# Patient Record
Sex: FEMALE
[De-identification: names, ages, dates, MRNs, and addresses within clinical notes are randomized per-mention and may not be internally consistent; named-entity substitution may affect disease eponyms.]

---

## 2018-01-19 ENCOUNTER — CHARTING TRANS (OUTPATIENT)
Dept: OTHER | Age: 73
End: 2018-01-19

## 2018-11-02 VITALS
OXYGEN SATURATION: 100 % | RESPIRATION RATE: 20 BRPM | TEMPERATURE: 98.1 F | DIASTOLIC BLOOD PRESSURE: 80 MMHG | HEART RATE: 80 BPM | SYSTOLIC BLOOD PRESSURE: 114 MMHG

## 2020-01-01 ENCOUNTER — APPOINTMENT (OUTPATIENT)
Dept: GENERAL RADIOLOGY | Age: 75
End: 2020-01-01
Attending: EMERGENCY MEDICINE
Payer: MEDICARE

## 2020-01-01 ENCOUNTER — HOSPITAL ENCOUNTER (OUTPATIENT)
Age: 75
Discharge: HOME | End: 2020-01-01
Payer: MEDICARE

## 2020-01-01 VITALS
SYSTOLIC BLOOD PRESSURE: 104 MMHG | OXYGEN SATURATION: 98 % | RESPIRATION RATE: 16 BRPM | HEART RATE: 81 BPM | TEMPERATURE: 98 F | DIASTOLIC BLOOD PRESSURE: 55 MMHG

## 2020-01-01 DIAGNOSIS — Z20.822 ENCOUNTER FOR LABORATORY TESTING FOR COVID-19 VIRUS: Primary | ICD-10-CM

## 2020-01-01 DIAGNOSIS — R05.9 COUGH: ICD-10-CM

## 2020-01-01 LAB — SARS-COV-2 RNA,QUAL, RT-PCR: DETECTED

## 2020-01-01 PROCEDURE — 99202 OFFICE O/P NEW SF 15 MIN: CPT | Performed by: EMERGENCY MEDICINE

## 2020-01-01 PROCEDURE — 71046 X-RAY EXAM CHEST 2 VIEWS: CPT | Performed by: EMERGENCY MEDICINE

## 2020-01-01 RX ORDER — LATANOPROST 50 UG/ML
1 SOLUTION/ DROPS OPHTHALMIC NIGHTLY
COMMUNITY
Start: 2020-01-01

## 2020-01-01 RX ORDER — GABAPENTIN 300 MG/1
300 CAPSULE ORAL
Status: ON HOLD | COMMUNITY
End: 2021-01-01

## 2020-01-01 RX ORDER — DILTIAZEM HYDROCHLORIDE 120 MG/1
240 CAPSULE, EXTENDED RELEASE ORAL DAILY
Status: ON HOLD | COMMUNITY
End: 2021-01-01

## 2020-01-01 RX ORDER — OXYBUTYNIN CHLORIDE 5 MG/1
5 TABLET ORAL DAILY
Status: ON HOLD | COMMUNITY
Start: 2020-01-01 | End: 2021-01-01

## 2020-01-01 RX ORDER — LEVOTHYROXINE SODIUM 112 UG/1
112 TABLET ORAL
COMMUNITY

## 2020-01-01 RX ORDER — FUROSEMIDE 40 MG/1
40 TABLET ORAL DAILY
Status: ON HOLD | COMMUNITY
End: 2021-01-01

## 2020-01-01 RX ORDER — HYDROCHLOROTHIAZIDE 12.5 MG/1
12.5 CAPSULE, GELATIN COATED ORAL DAILY
Status: ON HOLD | COMMUNITY
End: 2021-01-01

## 2020-01-01 RX ORDER — SIMVASTATIN 20 MG
20 TABLET ORAL DAILY
COMMUNITY
Start: 2020-01-01

## 2020-01-01 RX ORDER — BUMETANIDE 1 MG/1
1 TABLET ORAL DAILY
Status: ON HOLD | COMMUNITY
End: 2021-01-01

## 2020-01-01 RX ORDER — AMLODIPINE BESYLATE 10 MG/1
10 TABLET ORAL DAILY
COMMUNITY
Start: 2020-01-01

## 2020-01-01 RX ORDER — ALLOPURINOL 300 MG/1
300 TABLET ORAL DAILY
Status: ON HOLD | COMMUNITY
Start: 2020-01-01 | End: 2021-01-01

## 2020-01-01 RX ORDER — BENZONATATE 100 MG/1
100 CAPSULE ORAL 3 TIMES DAILY PRN
Qty: 14 CAPSULE | Refills: 0 | Status: ON HOLD | OUTPATIENT
Start: 2020-01-01 | End: 2021-01-01

## 2020-01-01 RX ORDER — DILTIAZEM HYDROCHLORIDE EXTENDED-RELEASE TABLETS 240 MG/1
240 TABLET, EXTENDED RELEASE ORAL DAILY
COMMUNITY
Start: 2020-01-01

## 2020-01-01 RX ORDER — ALENDRONATE SODIUM 70 MG/1
70 TABLET ORAL
Status: ON HOLD | COMMUNITY
End: 2021-01-01

## 2020-01-01 RX ORDER — ALBUTEROL SULFATE 90 UG/1
AEROSOL, METERED RESPIRATORY (INHALATION)
Qty: 1 INHALER | Refills: 0 | Status: ON HOLD | OUTPATIENT
Start: 2020-01-01 | End: 2021-01-01

## 2020-12-29 NOTE — ED PROVIDER NOTES
Patient Seen in: [facility]      History   Patient presents with:  Testing    Stated Complaint:     Farhad Esparza is a 76year old  female here for  Covid testing after expsure and sx of cough/chest congestion.  Conservative tx with some rel Pupils are equal, round, and reactive to light. Neck:      Musculoskeletal: Normal range of motion. Normal range of motion. No erythema, neck rigidity, pain with movement, spinous process tenderness or muscular tenderness.    Cardiovascular:      Rate and ER for further eval.  recommend rest, hydration, and otc measures. I have given the patient instructions regarding their diagnoses, expectations, follow up, and ER precautions.  I explained to the patient that emergent conditions may arise and to go to t

## 2021-01-01 ENCOUNTER — APPOINTMENT (OUTPATIENT)
Dept: CV DIAGNOSTICS | Facility: HOSPITAL | Age: 76
DRG: 208 | End: 2021-01-01
Attending: HOSPITALIST
Payer: MEDICARE

## 2021-01-01 ENCOUNTER — APPOINTMENT (OUTPATIENT)
Dept: ULTRASOUND IMAGING | Facility: HOSPITAL | Age: 76
DRG: 208 | End: 2021-01-01
Attending: HOSPITALIST
Payer: MEDICARE

## 2021-01-01 ENCOUNTER — APPOINTMENT (OUTPATIENT)
Dept: CT IMAGING | Facility: HOSPITAL | Age: 76
DRG: 208 | End: 2021-01-01
Attending: EMERGENCY MEDICINE
Payer: MEDICARE

## 2021-01-01 ENCOUNTER — APPOINTMENT (OUTPATIENT)
Dept: GENERAL RADIOLOGY | Facility: HOSPITAL | Age: 76
DRG: 208 | End: 2021-01-01
Attending: INTERNAL MEDICINE
Payer: MEDICARE

## 2021-01-01 ENCOUNTER — APPOINTMENT (OUTPATIENT)
Dept: CT IMAGING | Facility: HOSPITAL | Age: 76
DRG: 208 | End: 2021-01-01
Attending: HOSPITALIST
Payer: MEDICARE

## 2021-01-01 ENCOUNTER — APPOINTMENT (OUTPATIENT)
Dept: PICC SERVICES | Facility: HOSPITAL | Age: 76
DRG: 208 | End: 2021-01-01
Attending: INTERNAL MEDICINE
Payer: MEDICARE

## 2021-01-01 ENCOUNTER — APPOINTMENT (OUTPATIENT)
Dept: GENERAL RADIOLOGY | Facility: HOSPITAL | Age: 76
DRG: 208 | End: 2021-01-01
Attending: HOSPITALIST
Payer: MEDICARE

## 2021-01-01 ENCOUNTER — HOSPITAL ENCOUNTER (INPATIENT)
Facility: HOSPITAL | Age: 76
LOS: 6 days | DRG: 208 | End: 2021-01-01
Attending: EMERGENCY MEDICINE | Admitting: HOSPITALIST
Payer: MEDICARE

## 2021-01-01 ENCOUNTER — APPOINTMENT (OUTPATIENT)
Dept: GENERAL RADIOLOGY | Facility: HOSPITAL | Age: 76
DRG: 208 | End: 2021-01-01
Attending: EMERGENCY MEDICINE
Payer: MEDICARE

## 2021-01-01 VITALS
HEIGHT: 65 IN | WEIGHT: 218.5 LBS | TEMPERATURE: 92 F | SYSTOLIC BLOOD PRESSURE: 63 MMHG | OXYGEN SATURATION: 83 % | DIASTOLIC BLOOD PRESSURE: 35 MMHG | BODY MASS INDEX: 36.4 KG/M2

## 2021-01-01 DIAGNOSIS — N17.9 ACUTE KIDNEY INJURY (HCC): ICD-10-CM

## 2021-01-01 DIAGNOSIS — J12.82 PNEUMONIA DUE TO COVID-19 VIRUS: Primary | ICD-10-CM

## 2021-01-01 DIAGNOSIS — U07.1 PNEUMONIA DUE TO COVID-19 VIRUS: Primary | ICD-10-CM

## 2021-01-01 DIAGNOSIS — R55 SYNCOPE AND COLLAPSE: ICD-10-CM

## 2021-01-01 PROBLEM — R73.9 HYPERGLYCEMIA: Status: ACTIVE | Noted: 2021-01-01

## 2021-01-01 PROBLEM — E87.6 HYPOKALEMIA: Status: ACTIVE | Noted: 2021-01-01

## 2021-01-01 PROBLEM — E87.1 HYPONATREMIA: Status: ACTIVE | Noted: 2021-01-01

## 2021-01-01 LAB
ALBUMIN SERPL-MCNC: 2 G/DL (ref 3.4–5)
ALBUMIN SERPL-MCNC: 2.1 G/DL (ref 3.4–5)
ALBUMIN SERPL-MCNC: 2.2 G/DL (ref 3.4–5)
ALBUMIN SERPL-MCNC: 2.3 G/DL (ref 3.4–5)
ALBUMIN SERPL-MCNC: 2.7 G/DL (ref 3.4–5)
ALBUMIN SERPL-MCNC: 2.7 G/DL (ref 3.4–5)
ALBUMIN SERPL-MCNC: 3.2 G/DL (ref 3.4–5)
ALBUMIN/GLOB SERPL: 0.5 {RATIO} (ref 1–2)
ALBUMIN/GLOB SERPL: 0.7 {RATIO} (ref 1–2)
ALBUMIN/GLOB SERPL: 0.7 {RATIO} (ref 1–2)
ALP LIVER SERPL-CCNC: 145 U/L
ALP LIVER SERPL-CCNC: 51 U/L
ALP LIVER SERPL-CCNC: 57 U/L
ALP LIVER SERPL-CCNC: 57 U/L
ALP LIVER SERPL-CCNC: 59 U/L
ALP LIVER SERPL-CCNC: 71 U/L
ALP LIVER SERPL-CCNC: 94 U/L
ALT SERPL-CCNC: 29 U/L
ALT SERPL-CCNC: 30 U/L
ALT SERPL-CCNC: 30 U/L
ALT SERPL-CCNC: 33 U/L
ALT SERPL-CCNC: 38 U/L
ALT SERPL-CCNC: 42 U/L
ALT SERPL-CCNC: 88 U/L
ANION GAP SERPL CALC-SCNC: 11 MMOL/L (ref 0–18)
ANION GAP SERPL CALC-SCNC: 5 MMOL/L (ref 0–18)
ANION GAP SERPL CALC-SCNC: 7 MMOL/L (ref 0–18)
ANION GAP SERPL CALC-SCNC: 8 MMOL/L (ref 0–18)
ANION GAP SERPL CALC-SCNC: 9 MMOL/L (ref 0–18)
ANTIBODY SCREEN: NEGATIVE
APTT PPP: 35.3 SECONDS (ref 23.2–35.3)
APTT PPP: 84.9 SECONDS (ref 23.2–35.3)
APTT PPP: >240 SECONDS (ref 23.2–35.3)
APTT PPP: >240 SECONDS (ref 23.2–35.3)
AST SERPL-CCNC: 199 U/L (ref 15–37)
AST SERPL-CCNC: 34 U/L (ref 15–37)
AST SERPL-CCNC: 37 U/L (ref 15–37)
AST SERPL-CCNC: 42 U/L (ref 15–37)
AST SERPL-CCNC: 48 U/L (ref 15–37)
AST SERPL-CCNC: 66 U/L (ref 15–37)
AST SERPL-CCNC: 90 U/L (ref 15–37)
BASE EXCESS BLD CALC-SCNC: -1.2 MMOL/L (ref ?–2)
BASE EXCESS BLD CALC-SCNC: -13.5 MMOL/L (ref ?–2)
BASE EXCESS BLD CALC-SCNC: -2.2 MMOL/L (ref ?–2)
BASE EXCESS BLD CALC-SCNC: -2.3 MMOL/L (ref ?–2)
BASE EXCESS BLD CALC-SCNC: -9.7 MMOL/L (ref ?–2)
BASE EXCESS BLD CALC-SCNC: 0.5 MMOL/L (ref ?–2)
BASOPHILS # BLD AUTO: 0 X10(3) UL (ref 0–0.2)
BASOPHILS # BLD AUTO: 0.01 X10(3) UL (ref 0–0.2)
BASOPHILS NFR BLD AUTO: 0 %
BASOPHILS NFR BLD AUTO: 0.2 %
BILIRUB DIRECT SERPL-MCNC: 0.2 MG/DL (ref 0–0.2)
BILIRUB SERPL-MCNC: 0.3 MG/DL (ref 0.1–2)
BILIRUB SERPL-MCNC: 0.3 MG/DL (ref 0.1–2)
BILIRUB SERPL-MCNC: 0.5 MG/DL (ref 0.1–2)
BILIRUB SERPL-MCNC: 0.6 MG/DL (ref 0.1–2)
BILIRUB SERPL-MCNC: 0.7 MG/DL (ref 0.1–2)
BILIRUB UR QL: NEGATIVE
BUN BLD-MCNC: 14 MG/DL (ref 7–18)
BUN BLD-MCNC: 18 MG/DL (ref 7–18)
BUN BLD-MCNC: 20 MG/DL (ref 7–18)
BUN BLD-MCNC: 26 MG/DL (ref 7–18)
BUN BLD-MCNC: 27 MG/DL (ref 7–18)
BUN BLD-MCNC: 40 MG/DL (ref 7–18)
BUN BLD-MCNC: 50 MG/DL (ref 7–18)
BUN/CREAT SERPL: 10.9 (ref 10–20)
BUN/CREAT SERPL: 12.5 (ref 10–20)
BUN/CREAT SERPL: 14.5 (ref 10–20)
BUN/CREAT SERPL: 20 (ref 10–20)
BUN/CREAT SERPL: 20.4 (ref 10–20)
BUN/CREAT SERPL: 25 (ref 10–20)
BUN/CREAT SERPL: 26.5 (ref 10–20)
CALCIUM BLD-MCNC: 6.6 MG/DL (ref 8.5–10.1)
CALCIUM BLD-MCNC: 6.8 MG/DL (ref 8.5–10.1)
CALCIUM BLD-MCNC: 7.7 MG/DL (ref 8.5–10.1)
CALCIUM BLD-MCNC: 7.9 MG/DL (ref 8.5–10.1)
CALCIUM BLD-MCNC: 8.3 MG/DL (ref 8.5–10.1)
CALCIUM BLD-MCNC: 8.7 MG/DL (ref 8.5–10.1)
CALCIUM BLD-MCNC: 8.8 MG/DL (ref 8.5–10.1)
CHLORIDE SERPL-SCNC: 100 MMOL/L (ref 98–112)
CHLORIDE SERPL-SCNC: 103 MMOL/L (ref 98–112)
CHLORIDE SERPL-SCNC: 104 MMOL/L (ref 98–112)
CHLORIDE SERPL-SCNC: 107 MMOL/L (ref 98–112)
CHLORIDE SERPL-SCNC: 108 MMOL/L (ref 98–112)
CHLORIDE SERPL-SCNC: 109 MMOL/L (ref 98–112)
CHLORIDE SERPL-SCNC: 111 MMOL/L (ref 98–112)
CHOLEST SMN-MCNC: 97 MG/DL (ref ?–200)
CK SERPL-CCNC: 125 U/L
CO2 SERPL-SCNC: 20 MMOL/L (ref 21–32)
CO2 SERPL-SCNC: 22 MMOL/L (ref 21–32)
CO2 SERPL-SCNC: 23 MMOL/L (ref 21–32)
CO2 SERPL-SCNC: 23 MMOL/L (ref 21–32)
CO2 SERPL-SCNC: 24 MMOL/L (ref 21–32)
CO2 SERPL-SCNC: 24 MMOL/L (ref 21–32)
CO2 SERPL-SCNC: 26 MMOL/L (ref 21–32)
COLOR UR: YELLOW
CREAT BLD-MCNC: 0.9 MG/DL
CREAT BLD-MCNC: 0.98 MG/DL
CREAT BLD-MCNC: 1.12 MG/DL
CREAT BLD-MCNC: 1.38 MG/DL
CREAT BLD-MCNC: 1.6 MG/DL
CREAT BLD-MCNC: 2.45 MG/DL
CREAT BLD-MCNC: 2.47 MG/DL
CREAT UR-SCNC: 99.4 MG/DL
CRP SERPL-MCNC: 11.7 MG/DL (ref ?–0.3)
CRP SERPL-MCNC: 13.8 MG/DL (ref ?–0.3)
CRP SERPL-MCNC: 14.5 MG/DL (ref ?–0.3)
CRP SERPL-MCNC: 9.51 MG/DL (ref ?–0.3)
CRP SERPL-MCNC: 9.62 MG/DL (ref ?–0.3)
D DIMER PPP FEU-MCNC: 0.64 UG/ML FEU (ref ?–0.75)
D DIMER PPP FEU-MCNC: 0.71 UG/ML FEU (ref ?–0.75)
D DIMER PPP FEU-MCNC: 0.82 UG/ML FEU (ref ?–0.75)
D DIMER PPP FEU-MCNC: 0.87 UG/ML FEU (ref ?–0.75)
D DIMER PPP FEU-MCNC: 0.89 UG/ML FEU (ref ?–0.75)
DEPRECATED HBV CORE AB SER IA-ACNC: 147.3 NG/ML
DEPRECATED HBV CORE AB SER IA-ACNC: 184.2 NG/ML
DEPRECATED HBV CORE AB SER IA-ACNC: 354 NG/ML
DEPRECATED HBV CORE AB SER IA-ACNC: 454.9 NG/ML
DEPRECATED HBV CORE AB SER IA-ACNC: 578.8 NG/ML
DEPRECATED RDW RBC AUTO: 45.4 FL (ref 35.1–46.3)
DEPRECATED RDW RBC AUTO: 46.3 FL (ref 35.1–46.3)
DEPRECATED RDW RBC AUTO: 47.1 FL (ref 35.1–46.3)
DEPRECATED RDW RBC AUTO: 47.2 FL (ref 35.1–46.3)
DEPRECATED RDW RBC AUTO: 47.8 FL (ref 35.1–46.3)
DEPRECATED RDW RBC AUTO: 49.2 FL (ref 35.1–46.3)
DEPRECATED RDW RBC AUTO: 51.2 FL (ref 35.1–46.3)
EOSINOPHIL # BLD AUTO: 0 X10(3) UL (ref 0–0.7)
EOSINOPHIL # BLD AUTO: 0 X10(3) UL (ref 0–0.7)
EOSINOPHIL NFR BLD AUTO: 0 %
EOSINOPHIL NFR BLD AUTO: 0 %
ERYTHROCYTE [DISTWIDTH] IN BLOOD BY AUTOMATED COUNT: 15.5 % (ref 11–15)
ERYTHROCYTE [DISTWIDTH] IN BLOOD BY AUTOMATED COUNT: 15.7 % (ref 11–15)
ERYTHROCYTE [DISTWIDTH] IN BLOOD BY AUTOMATED COUNT: 15.9 % (ref 11–15)
ERYTHROCYTE [DISTWIDTH] IN BLOOD BY AUTOMATED COUNT: 16 % (ref 11–15)
ERYTHROCYTE [DISTWIDTH] IN BLOOD BY AUTOMATED COUNT: 16.2 % (ref 11–15)
EST. AVERAGE GLUCOSE BLD GHB EST-MCNC: 140 MG/DL (ref 68–126)
GLOBULIN PLAS-MCNC: 3.9 G/DL (ref 2.8–4.4)
GLOBULIN PLAS-MCNC: 4 G/DL (ref 2.8–4.4)
GLOBULIN PLAS-MCNC: 4.2 G/DL (ref 2.8–4.4)
GLOBULIN PLAS-MCNC: 4.5 G/DL (ref 2.8–4.4)
GLUCOSE BLD-MCNC: 110 MG/DL (ref 70–99)
GLUCOSE BLD-MCNC: 112 MG/DL (ref 70–99)
GLUCOSE BLD-MCNC: 139 MG/DL (ref 70–99)
GLUCOSE BLD-MCNC: 147 MG/DL (ref 70–99)
GLUCOSE BLD-MCNC: 170 MG/DL (ref 70–99)
GLUCOSE BLD-MCNC: 202 MG/DL (ref 70–99)
GLUCOSE BLD-MCNC: 477 MG/DL (ref 70–99)
GLUCOSE BLDC GLUCOMTR-MCNC: 127 MG/DL (ref 70–99)
GLUCOSE BLDC GLUCOMTR-MCNC: 135 MG/DL (ref 70–99)
GLUCOSE BLDC GLUCOMTR-MCNC: 152 MG/DL (ref 70–99)
GLUCOSE BLDC GLUCOMTR-MCNC: 168 MG/DL (ref 70–99)
GLUCOSE BLDC GLUCOMTR-MCNC: 180 MG/DL (ref 70–99)
GLUCOSE BLDC GLUCOMTR-MCNC: 185 MG/DL (ref 70–99)
GLUCOSE BLDC GLUCOMTR-MCNC: 228 MG/DL (ref 70–99)
GLUCOSE BLDC GLUCOMTR-MCNC: 239 MG/DL (ref 70–99)
GLUCOSE BLDC GLUCOMTR-MCNC: 242 MG/DL (ref 70–99)
GLUCOSE BLDC GLUCOMTR-MCNC: 245 MG/DL (ref 70–99)
GLUCOSE BLDC GLUCOMTR-MCNC: 254 MG/DL (ref 70–99)
GLUCOSE BLDC GLUCOMTR-MCNC: 269 MG/DL (ref 70–99)
GLUCOSE BLDC GLUCOMTR-MCNC: 313 MG/DL (ref 70–99)
GLUCOSE BLDC GLUCOMTR-MCNC: 314 MG/DL (ref 70–99)
GLUCOSE BLDC GLUCOMTR-MCNC: 378 MG/DL (ref 70–99)
GLUCOSE BLDC GLUCOMTR-MCNC: 395 MG/DL (ref 70–99)
GLUCOSE BLDC GLUCOMTR-MCNC: 439 MG/DL (ref 70–99)
GLUCOSE BLDC GLUCOMTR-MCNC: 442 MG/DL (ref 70–99)
GLUCOSE BLDC GLUCOMTR-MCNC: 457 MG/DL (ref 70–99)
GLUCOSE BLDC GLUCOMTR-MCNC: 476 MG/DL (ref 70–99)
GLUCOSE BLDC GLUCOMTR-MCNC: 493 MG/DL (ref 70–99)
GLUCOSE BLDC GLUCOMTR-MCNC: 509 MG/DL (ref 70–99)
GLUCOSE UR-MCNC: NEGATIVE MG/DL
HAV IGM SER QL: 1.8 MG/DL (ref 1.6–2.6)
HAV IGM SER QL: 2.5 MG/DL (ref 1.6–2.6)
HBA1C MFR BLD HPLC: 6.5 % (ref ?–5.7)
HCO3 BLDA-SCNC: 14.4 MEQ/L (ref 21–27)
HCO3 BLDA-SCNC: 17.2 MEQ/L (ref 21–27)
HCO3 BLDA-SCNC: 22.8 MEQ/L (ref 21–27)
HCO3 BLDA-SCNC: 23.1 MEQ/L (ref 21–27)
HCO3 BLDA-SCNC: 23.7 MEQ/L (ref 21–27)
HCO3 BLDA-SCNC: 25.2 MEQ/L (ref 21–27)
HCT VFR BLD AUTO: 32.6 %
HCT VFR BLD AUTO: 34 %
HCT VFR BLD AUTO: 34.1 %
HCT VFR BLD AUTO: 34.8 %
HCT VFR BLD AUTO: 35.7 %
HCT VFR BLD AUTO: 38.3 %
HCT VFR BLD AUTO: 38.4 %
HDLC SERPL-MCNC: 33 MG/DL (ref 40–59)
HGB BLD-MCNC: 10.7 G/DL
HGB BLD-MCNC: 11 G/DL
HGB BLD-MCNC: 11.1 G/DL
HGB BLD-MCNC: 11.4 G/DL
HGB BLD-MCNC: 11.5 G/DL
HGB BLD-MCNC: 12.6 G/DL
HGB BLD-MCNC: 12.7 G/DL
HGB UR QL STRIP.AUTO: NEGATIVE
IMM GRANULOCYTES # BLD AUTO: 0.01 X10(3) UL (ref 0–1)
IMM GRANULOCYTES # BLD AUTO: 0.02 X10(3) UL (ref 0–1)
IMM GRANULOCYTES NFR BLD: 0.4 %
IMM GRANULOCYTES NFR BLD: 0.4 %
KETONES UR-MCNC: NEGATIVE MG/DL
LACTATE SERPL-SCNC: 1.6 MMOL/L (ref 0.4–2)
LDH SERPL L TO P-CCNC: 287 U/L
LDH SERPL L TO P-CCNC: 314 U/L
LDH SERPL L TO P-CCNC: 426 U/L
LDH SERPL L TO P-CCNC: 570 U/L
LDH SERPL L TO P-CCNC: 876 U/L
LDLC SERPL CALC-MCNC: 36 MG/DL (ref ?–100)
LEUKOCYTE ESTERASE UR QL STRIP.AUTO: NEGATIVE
LYMPHOCYTES # BLD AUTO: 0.58 X10(3) UL (ref 1–4)
LYMPHOCYTES # BLD AUTO: 0.77 X10(3) UL (ref 1–4)
LYMPHOCYTES NFR BLD AUTO: 14.8 %
LYMPHOCYTES NFR BLD AUTO: 22 %
M PROTEIN MFR SERPL ELPH: 6 G/DL (ref 6.4–8.2)
M PROTEIN MFR SERPL ELPH: 6.3 G/DL (ref 6.4–8.2)
M PROTEIN MFR SERPL ELPH: 6.4 G/DL (ref 6.4–8.2)
M PROTEIN MFR SERPL ELPH: 6.5 G/DL (ref 6.4–8.2)
M PROTEIN MFR SERPL ELPH: 6.6 G/DL (ref 6.4–8.2)
M PROTEIN MFR SERPL ELPH: 7.1 G/DL (ref 6.4–8.2)
M PROTEIN MFR SERPL ELPH: 7.7 G/DL (ref 6.4–8.2)
MCH RBC QN AUTO: 26.6 PG (ref 26–34)
MCH RBC QN AUTO: 26.8 PG (ref 26–34)
MCH RBC QN AUTO: 27 PG (ref 26–34)
MCH RBC QN AUTO: 27.1 PG (ref 26–34)
MCH RBC QN AUTO: 27.1 PG (ref 26–34)
MCH RBC QN AUTO: 27.2 PG (ref 26–34)
MCH RBC QN AUTO: 27.2 PG (ref 26–34)
MCHC RBC AUTO-ENTMCNC: 30.7 G/DL (ref 31–37)
MCHC RBC AUTO-ENTMCNC: 32.2 G/DL (ref 31–37)
MCHC RBC AUTO-ENTMCNC: 32.6 G/DL (ref 31–37)
MCHC RBC AUTO-ENTMCNC: 32.8 G/DL (ref 31–37)
MCHC RBC AUTO-ENTMCNC: 33.2 G/DL (ref 31–37)
MCHC RBC AUTO-ENTMCNC: 33.5 G/DL (ref 31–37)
MCHC RBC AUTO-ENTMCNC: 33.7 G/DL (ref 31–37)
MCV RBC AUTO: 80.3 FL
MCV RBC AUTO: 81.1 FL
MCV RBC AUTO: 81.8 FL
MCV RBC AUTO: 82.4 FL
MCV RBC AUTO: 82.4 FL
MCV RBC AUTO: 82.9 FL
MCV RBC AUTO: 87.7 FL
MODIFIED ALLEN TEST: POSITIVE
MONOCYTES # BLD AUTO: 0.19 X10(3) UL (ref 0.1–1)
MONOCYTES # BLD AUTO: 0.51 X10(3) UL (ref 0.1–1)
MONOCYTES NFR BLD AUTO: 7.2 %
MONOCYTES NFR BLD AUTO: 9.8 %
NEUTROPHILS # BLD AUTO: 1.86 X10 (3) UL (ref 1.5–7.7)
NEUTROPHILS # BLD AUTO: 1.86 X10(3) UL (ref 1.5–7.7)
NEUTROPHILS # BLD AUTO: 3.91 X10 (3) UL (ref 1.5–7.7)
NEUTROPHILS # BLD AUTO: 3.91 X10(3) UL (ref 1.5–7.7)
NEUTROPHILS NFR BLD AUTO: 70.4 %
NEUTROPHILS NFR BLD AUTO: 74.8 %
NITRITE UR QL STRIP.AUTO: NEGATIVE
NONHDLC SERPL-MCNC: 64 MG/DL (ref ?–130)
NT-PROBNP SERPL-MCNC: 429 PG/ML (ref ?–450)
NT-PROBNP SERPL-MCNC: 978 PG/ML (ref ?–450)
O2 CT BLD-SCNC: 11.3 VOL% (ref 15–23)
O2 CT BLD-SCNC: 13.2 VOL% (ref 15–23)
O2 CT BLD-SCNC: 13.5 VOL% (ref 15–23)
O2 CT BLD-SCNC: 13.7 VOL% (ref 15–23)
O2 CT BLD-SCNC: 15.1 VOL% (ref 15–23)
O2 CT BLD-SCNC: 15.2 VOL% (ref 15–23)
O2/TOTAL GAS SETTING VFR VENT: 100 %
O2/TOTAL GAS SETTING VFR VENT: 70 %
O2/TOTAL GAS SETTING VFR VENT: 90 %
OSMOLALITY SERPL CALC.SUM OF ELEC: 285 MOSM/KG (ref 275–295)
OSMOLALITY SERPL CALC.SUM OF ELEC: 291 MOSM/KG (ref 275–295)
OSMOLALITY SERPL CALC.SUM OF ELEC: 295 MOSM/KG (ref 275–295)
OSMOLALITY SERPL CALC.SUM OF ELEC: 303 MOSM/KG (ref 275–295)
OSMOLALITY SERPL CALC.SUM OF ELEC: 309 MOSM/KG (ref 275–295)
PCO2 BLDA: 33 MM HG (ref 35–45)
PCO2 BLDA: 33 MM HG (ref 35–45)
PCO2 BLDA: 37 MM HG (ref 35–45)
PCO2 BLDA: 38 MM HG (ref 35–45)
PCO2 BLDA: 40 MM HG (ref 35–45)
PCO2 BLDA: 70 MM HG (ref 35–45)
PEEP SETTING VENT: 12 CM H2O
PEEP SETTING VENT: 14 CM H2O
PEEP SETTING VENT: 8 CM H2O
PH BLDA: 7.01 [PH] (ref 7.35–7.45)
PH BLDA: 7.24 [PH] (ref 7.35–7.45)
PH BLDA: 7.38 [PH] (ref 7.35–7.45)
PH BLDA: 7.39 [PH] (ref 7.35–7.45)
PH BLDA: 7.44 [PH] (ref 7.35–7.45)
PH BLDA: 7.47 [PH] (ref 7.35–7.45)
PH UR: 5 [PH] (ref 5–8)
PHOSPHATE SERPL-MCNC: 3 MG/DL (ref 2.5–4.9)
PLATELET # BLD AUTO: 137 10(3)UL (ref 150–450)
PLATELET # BLD AUTO: 146 10(3)UL (ref 150–450)
PLATELET # BLD AUTO: 154 10(3)UL (ref 150–450)
PLATELET # BLD AUTO: 164 10(3)UL (ref 150–450)
PLATELET # BLD AUTO: 188 10(3)UL (ref 150–450)
PLATELET # BLD AUTO: 365 10(3)UL (ref 150–450)
PLATELET # BLD AUTO: 421 10(3)UL (ref 150–450)
PO2 BLDA: 50 MM HG (ref 80–100)
PO2 BLDA: 51 MM HG (ref 80–100)
PO2 BLDA: 59 MM HG (ref 80–100)
PO2 BLDA: 69 MM HG (ref 80–100)
PO2 BLDA: 71 MM HG (ref 80–100)
PO2 BLDA: 99 MM HG (ref 80–100)
POTASSIUM SERPL-SCNC: 3 MMOL/L (ref 3.5–5.1)
POTASSIUM SERPL-SCNC: 3.3 MMOL/L (ref 3.5–5.1)
POTASSIUM SERPL-SCNC: 3.3 MMOL/L (ref 3.5–5.1)
POTASSIUM SERPL-SCNC: 3.8 MMOL/L (ref 3.5–5.1)
POTASSIUM SERPL-SCNC: 3.8 MMOL/L (ref 3.5–5.1)
POTASSIUM SERPL-SCNC: 4 MMOL/L (ref 3.5–5.1)
POTASSIUM SERPL-SCNC: 4.2 MMOL/L (ref 3.5–5.1)
POTASSIUM SERPL-SCNC: 4.4 MMOL/L (ref 3.5–5.1)
PROCALCITONIN SERPL-MCNC: 0.34 NG/ML (ref ?–0.16)
PROT UR-MCNC: NEGATIVE MG/DL
PUNCTURE CHARGE: NO
PUNCTURE CHARGE: YES
RBC # BLD AUTO: 3.97 X10(6)UL
RBC # BLD AUTO: 4.06 X10(6)UL
RBC # BLD AUTO: 4.14 X10(6)UL
RBC # BLD AUTO: 4.19 X10(6)UL
RBC # BLD AUTO: 4.33 X10(6)UL
RBC # BLD AUTO: 4.63 X10(6)UL
RBC # BLD AUTO: 4.68 X10(6)UL
RESP RATE: 14 BPM
RESP RATE: 14 BPM
RESP RATE: 24 BPM
RH BLOOD TYPE: POSITIVE
SAO2 % BLDA: 85.4 % (ref 94–100)
SAO2 % BLDA: 86.2 % (ref 94–100)
SAO2 % BLDA: 94.2 % (ref 94–100)
SAO2 % BLDA: 94.3 % (ref 94–100)
SAO2 % BLDA: 96 % (ref 94–100)
SAO2 % BLDA: >99 % (ref 94–100)
SARS-COV-2 IGG+IGM SERPL QL IA: NONREACTIVE
SODIUM SERPL-SCNC: 134 MMOL/L (ref 136–145)
SODIUM SERPL-SCNC: 138 MMOL/L (ref 136–145)
SODIUM SERPL-SCNC: 139 MMOL/L (ref 136–145)
SODIUM SERPL-SCNC: 140 MMOL/L (ref 136–145)
SODIUM SERPL-SCNC: 141 MMOL/L (ref 136–145)
SODIUM SERPL-SCNC: <5 MMOL/L
SP GR UR STRIP: 1.02 (ref 1–1.03)
SPECIMEN VOL 24H UR: 380 ML
SPECIMEN VOL 24H UR: 400 ML
TRIGL SERPL-MCNC: 140 MG/DL (ref 30–149)
TRIGL SERPL-MCNC: 64 MG/DL (ref 30–149)
TROPONIN I SERPL-MCNC: 1.95 NG/ML (ref ?–0.04)
TROPONIN I SERPL-MCNC: 2.59 NG/ML (ref ?–0.04)
TROPONIN I SERPL-MCNC: 2.84 NG/ML (ref ?–0.04)
TROPONIN I SERPL-MCNC: <0.045 NG/ML (ref ?–0.04)
UROBILINOGEN UR STRIP-ACNC: <2
UUN UR-MCNC: 542 MG/DL
VLDLC SERPL CALC-MCNC: 28 MG/DL (ref 0–30)
WBC # BLD AUTO: 17.9 X10(3) UL (ref 4–11)
WBC # BLD AUTO: 2.6 X10(3) UL (ref 4–11)
WBC # BLD AUTO: 26 X10(3) UL (ref 4–11)
WBC # BLD AUTO: 4.3 X10(3) UL (ref 4–11)
WBC # BLD AUTO: 4.6 X10(3) UL (ref 4–11)
WBC # BLD AUTO: 4.8 X10(3) UL (ref 4–11)
WBC # BLD AUTO: 5.2 X10(3) UL (ref 4–11)

## 2021-01-01 PROCEDURE — 71260 CT THORAX DX C+: CPT | Performed by: HOSPITALIST

## 2021-01-01 PROCEDURE — 93306 TTE W/DOPPLER COMPLETE: CPT | Performed by: HOSPITALIST

## 2021-01-01 PROCEDURE — 3E0333Z INTRODUCTION OF ANTI-INFLAMMATORY INTO PERIPHERAL VEIN, PERCUTANEOUS APPROACH: ICD-10-PCS | Performed by: HOSPITALIST

## 2021-01-01 PROCEDURE — 71045 X-RAY EXAM CHEST 1 VIEW: CPT | Performed by: INTERNAL MEDICINE

## 2021-01-01 PROCEDURE — XW033E5 INTRODUCTION OF REMDESIVIR ANTI-INFECTIVE INTO PERIPHERAL VEIN, PERCUTANEOUS APPROACH, NEW TECHNOLOGY GROUP 5: ICD-10-PCS | Performed by: HOSPITALIST

## 2021-01-01 PROCEDURE — 0BH17EZ INSERTION OF ENDOTRACHEAL AIRWAY INTO TRACHEA, VIA NATURAL OR ARTIFICIAL OPENING: ICD-10-PCS | Performed by: HOSPITALIST

## 2021-01-01 PROCEDURE — 03HY32Z INSERTION OF MONITORING DEVICE INTO UPPER ARTERY, PERCUTANEOUS APPROACH: ICD-10-PCS | Performed by: INTERNAL MEDICINE

## 2021-01-01 PROCEDURE — 70450 CT HEAD/BRAIN W/O DYE: CPT | Performed by: EMERGENCY MEDICINE

## 2021-01-01 PROCEDURE — 71045 X-RAY EXAM CHEST 1 VIEW: CPT | Performed by: EMERGENCY MEDICINE

## 2021-01-01 PROCEDURE — 02HV33Z INSERTION OF INFUSION DEVICE INTO SUPERIOR VENA CAVA, PERCUTANEOUS APPROACH: ICD-10-PCS | Performed by: HOSPITALIST

## 2021-01-01 PROCEDURE — 71045 X-RAY EXAM CHEST 1 VIEW: CPT | Performed by: HOSPITALIST

## 2021-01-01 PROCEDURE — 93970 EXTREMITY STUDY: CPT | Performed by: HOSPITALIST

## 2021-01-01 PROCEDURE — 5A0935Z ASSISTANCE WITH RESPIRATORY VENTILATION, LESS THAN 24 CONSECUTIVE HOURS: ICD-10-PCS | Performed by: HOSPITALIST

## 2021-01-01 PROCEDURE — 99223 1ST HOSP IP/OBS HIGH 75: CPT | Performed by: OTHER

## 2021-01-01 PROCEDURE — 5A1945Z RESPIRATORY VENTILATION, 24-96 CONSECUTIVE HOURS: ICD-10-PCS | Performed by: HOSPITALIST

## 2021-01-01 RX ORDER — ENOXAPARIN SODIUM 100 MG/ML
40 INJECTION SUBCUTANEOUS ONCE
Status: COMPLETED | OUTPATIENT
Start: 2021-01-01 | End: 2021-01-01

## 2021-01-01 RX ORDER — DOBUTAMINE HYDROCHLORIDE 200 MG/100ML
2.5 INJECTION INTRAVENOUS CONTINUOUS
Status: DISCONTINUED | OUTPATIENT
Start: 2021-01-01 | End: 2021-01-08

## 2021-01-01 RX ORDER — HEPARIN SODIUM AND DEXTROSE 10000; 5 [USP'U]/100ML; G/100ML
18 INJECTION INTRAVENOUS ONCE
Status: COMPLETED | OUTPATIENT
Start: 2021-01-01 | End: 2021-01-01

## 2021-01-01 RX ORDER — BACLOFEN 10 MG/1
10 TABLET ORAL 3 TIMES DAILY
COMMUNITY

## 2021-01-01 RX ORDER — DILTIAZEM HYDROCHLORIDE 120 MG/1
120 CAPSULE, EXTENDED RELEASE ORAL DAILY
Status: DISCONTINUED | OUTPATIENT
Start: 2021-01-01 | End: 2021-01-01

## 2021-01-01 RX ORDER — AMLODIPINE BESYLATE 10 MG/1
10 TABLET ORAL DAILY
Status: DISCONTINUED | OUTPATIENT
Start: 2021-01-01 | End: 2021-01-01

## 2021-01-01 RX ORDER — ENOXAPARIN SODIUM 100 MG/ML
1 INJECTION SUBCUTANEOUS EVERY 12 HOURS SCHEDULED
Status: DISCONTINUED | OUTPATIENT
Start: 2021-01-01 | End: 2021-01-01

## 2021-01-01 RX ORDER — LIDOCAINE HYDROCHLORIDE 10 MG/ML
5 INJECTION, SOLUTION EPIDURAL; INFILTRATION; INTRACAUDAL; PERINEURAL ONCE
Status: DISCONTINUED | OUTPATIENT
Start: 2021-01-01 | End: 2021-01-08

## 2021-01-01 RX ORDER — METOCLOPRAMIDE HYDROCHLORIDE 5 MG/ML
5 INJECTION INTRAMUSCULAR; INTRAVENOUS EVERY 8 HOURS PRN
Status: DISCONTINUED | OUTPATIENT
Start: 2021-01-01 | End: 2021-01-08

## 2021-01-01 RX ORDER — LORAZEPAM 2 MG/ML
1 INJECTION INTRAMUSCULAR ONCE
Status: COMPLETED | OUTPATIENT
Start: 2021-01-01 | End: 2021-01-01

## 2021-01-01 RX ORDER — LATANOPROST 50 UG/ML
1 SOLUTION/ DROPS OPHTHALMIC NIGHTLY
Status: DISCONTINUED | OUTPATIENT
Start: 2021-01-01 | End: 2021-01-08

## 2021-01-01 RX ORDER — BACLOFEN 10 MG/1
10 TABLET ORAL 3 TIMES DAILY PRN
Status: DISCONTINUED | OUTPATIENT
Start: 2021-01-01 | End: 2021-01-08

## 2021-01-01 RX ORDER — ALBUTEROL SULFATE 90 UG/1
4 AEROSOL, METERED RESPIRATORY (INHALATION) 4 TIMES DAILY
Status: DISCONTINUED | OUTPATIENT
Start: 2021-01-01 | End: 2021-01-01

## 2021-01-01 RX ORDER — LORAZEPAM 2 MG/ML
0.5 INJECTION INTRAMUSCULAR ONCE
Status: COMPLETED | OUTPATIENT
Start: 2021-01-01 | End: 2021-01-01

## 2021-01-01 RX ORDER — MAGNESIUM SULFATE HEPTAHYDRATE 40 MG/ML
2 INJECTION, SOLUTION INTRAVENOUS ONCE
Status: COMPLETED | OUTPATIENT
Start: 2021-01-01 | End: 2021-01-01

## 2021-01-01 RX ORDER — ONDANSETRON 2 MG/ML
4 INJECTION INTRAMUSCULAR; INTRAVENOUS EVERY 6 HOURS PRN
Status: DISCONTINUED | OUTPATIENT
Start: 2021-01-01 | End: 2021-01-08

## 2021-01-01 RX ORDER — LOSARTAN POTASSIUM AND HYDROCHLOROTHIAZIDE 25; 100 MG/1; MG/1
1 TABLET ORAL DAILY
COMMUNITY

## 2021-01-01 RX ORDER — DILTIAZEM HYDROCHLORIDE 120 MG/1
240 CAPSULE, EXTENDED RELEASE ORAL DAILY
Status: DISCONTINUED | OUTPATIENT
Start: 2021-01-01 | End: 2021-01-01

## 2021-01-01 RX ORDER — ACETAMINOPHEN 325 MG/1
650 TABLET ORAL EVERY 6 HOURS PRN
Status: DISCONTINUED | OUTPATIENT
Start: 2021-01-01 | End: 2021-01-01

## 2021-01-01 RX ORDER — ETOMIDATE 2 MG/ML
INJECTION INTRAVENOUS
Status: COMPLETED
Start: 2021-01-01 | End: 2021-01-01

## 2021-01-01 RX ORDER — LORAZEPAM 2 MG/ML
2 INJECTION INTRAMUSCULAR
Status: DISCONTINUED | OUTPATIENT
Start: 2021-01-01 | End: 2021-01-08

## 2021-01-01 RX ORDER — GUAIFENESIN 100 MG/5ML
200 SOLUTION ORAL EVERY 4 HOURS PRN
Status: DISCONTINUED | OUTPATIENT
Start: 2021-01-01 | End: 2021-01-08

## 2021-01-01 RX ORDER — ALBUTEROL SULFATE 90 UG/1
1 AEROSOL, METERED RESPIRATORY (INHALATION) 4 TIMES DAILY
Status: DISCONTINUED | OUTPATIENT
Start: 2021-01-01 | End: 2021-01-01

## 2021-01-01 RX ORDER — ATORVASTATIN CALCIUM 40 MG/1
40 TABLET, FILM COATED ORAL NIGHTLY
Status: DISCONTINUED | OUTPATIENT
Start: 2021-01-01 | End: 2021-01-08

## 2021-01-01 RX ORDER — POTASSIUM CHLORIDE 1.5 G/1.77G
40 POWDER, FOR SOLUTION ORAL ONCE
Status: COMPLETED | OUTPATIENT
Start: 2021-01-01 | End: 2021-01-01

## 2021-01-01 RX ORDER — DEXAMETHASONE 6 MG/1
6 TABLET ORAL
Status: DISCONTINUED | OUTPATIENT
Start: 2021-01-01 | End: 2021-01-01

## 2021-01-01 RX ORDER — HEPARIN SODIUM 5000 [USP'U]/ML
5000 INJECTION, SOLUTION INTRAVENOUS; SUBCUTANEOUS EVERY 8 HOURS SCHEDULED
Status: DISCONTINUED | OUTPATIENT
Start: 2021-01-01 | End: 2021-01-01

## 2021-01-01 RX ORDER — AZITHROMYCIN 250 MG/1
500 TABLET, FILM COATED ORAL
Status: DISCONTINUED | OUTPATIENT
Start: 2021-01-01 | End: 2021-01-08

## 2021-01-01 RX ORDER — ENOXAPARIN SODIUM 100 MG/ML
0.6 INJECTION SUBCUTANEOUS EVERY 12 HOURS SCHEDULED
Status: DISCONTINUED | OUTPATIENT
Start: 2021-01-01 | End: 2021-01-01

## 2021-01-01 RX ORDER — LEVOTHYROXINE SODIUM 112 UG/1
112 TABLET ORAL
Status: DISCONTINUED | OUTPATIENT
Start: 2021-01-01 | End: 2021-01-08

## 2021-01-01 RX ORDER — ACETAMINOPHEN 650 MG/1
650 SUPPOSITORY RECTAL EVERY 6 HOURS PRN
Status: DISCONTINUED | OUTPATIENT
Start: 2021-01-01 | End: 2021-01-08

## 2021-01-01 RX ORDER — FUROSEMIDE 10 MG/ML
40 INJECTION INTRAMUSCULAR; INTRAVENOUS DAILY
Status: DISCONTINUED | OUTPATIENT
Start: 2021-01-01 | End: 2021-01-01

## 2021-01-01 RX ORDER — BUMETANIDE 0.25 MG/ML
2 INJECTION, SOLUTION INTRAMUSCULAR; INTRAVENOUS
Status: DISCONTINUED | OUTPATIENT
Start: 2021-01-01 | End: 2021-01-01

## 2021-01-01 RX ORDER — ACETAMINOPHEN 160 MG/5ML
650 SOLUTION ORAL EVERY 6 HOURS PRN
Status: DISCONTINUED | OUTPATIENT
Start: 2021-01-01 | End: 2021-01-08

## 2021-01-01 RX ORDER — HEPARIN SODIUM AND DEXTROSE 10000; 5 [USP'U]/100ML; G/100ML
INJECTION INTRAVENOUS CONTINUOUS
Status: DISCONTINUED | OUTPATIENT
Start: 2021-01-01 | End: 2021-01-08

## 2021-01-01 RX ORDER — DEXTROSE MONOHYDRATE 25 G/50ML
50 INJECTION, SOLUTION INTRAVENOUS
Status: DISCONTINUED | OUTPATIENT
Start: 2021-01-01 | End: 2021-01-08

## 2021-01-01 RX ORDER — ROSUVASTATIN CALCIUM 40 MG/1
40 TABLET, COATED ORAL NIGHTLY
COMMUNITY

## 2021-01-01 RX ORDER — BACLOFEN 10 MG/1
10 TABLET ORAL 3 TIMES DAILY
Status: DISCONTINUED | OUTPATIENT
Start: 2021-01-01 | End: 2021-01-01

## 2021-01-01 RX ORDER — ALBUTEROL SULFATE 90 UG/1
2 AEROSOL, METERED RESPIRATORY (INHALATION) 4 TIMES DAILY
Status: DISCONTINUED | OUTPATIENT
Start: 2021-01-01 | End: 2021-01-01

## 2021-01-01 RX ORDER — SODIUM CHLORIDE 9 MG/ML
INJECTION, SOLUTION INTRAVENOUS ONCE
Status: COMPLETED | OUTPATIENT
Start: 2021-01-01 | End: 2021-01-01

## 2021-01-01 RX ORDER — POTASSIUM CHLORIDE 20 MEQ/1
40 TABLET, EXTENDED RELEASE ORAL EVERY 4 HOURS
Status: COMPLETED | OUTPATIENT
Start: 2021-01-01 | End: 2021-01-01

## 2021-01-01 RX ORDER — CHLORHEXIDINE GLUCONATE 0.12 MG/ML
15 RINSE ORAL
Status: DISCONTINUED | OUTPATIENT
Start: 2021-01-01 | End: 2021-01-08

## 2021-01-01 RX ORDER — BENZONATATE 100 MG/1
100 CAPSULE ORAL 3 TIMES DAILY PRN
Status: DISCONTINUED | OUTPATIENT
Start: 2021-01-01 | End: 2021-01-08

## 2021-01-01 RX ORDER — ACETAMINOPHEN 325 MG/1
650 TABLET ORAL EVERY 6 HOURS PRN
Status: DISCONTINUED | OUTPATIENT
Start: 2021-01-01 | End: 2021-01-08

## 2021-01-01 RX ORDER — MINERAL OIL AND PETROLATUM 150; 830 MG/G; MG/G
OINTMENT OPHTHALMIC 3 TIMES DAILY
Status: DISCONTINUED | OUTPATIENT
Start: 2021-01-01 | End: 2021-01-08

## 2021-01-01 RX ORDER — ROSUVASTATIN CALCIUM 20 MG/1
40 TABLET, COATED ORAL NIGHTLY
Status: DISCONTINUED | OUTPATIENT
Start: 2021-01-01 | End: 2021-01-01

## 2021-01-01 RX ORDER — DEXAMETHASONE SODIUM PHOSPHATE 4 MG/ML
10 VIAL (ML) INJECTION EVERY 12 HOURS
Status: DISCONTINUED | OUTPATIENT
Start: 2021-01-01 | End: 2021-01-08

## 2021-01-02 NOTE — H&P
[facility] Hospitalist H&P       CC: Patient presents with:  Seizures       PCP: ANITA Nuñez    Date of Admission: 1/1/2021  8:04 PM    ASSESSMENT / PLAN:     Ms. Elias Logan is a 75 yo F with PMH of HTN, HL who presented with possible seizure activity. home. No prior hx of seizures. Patient was talking on the phone with her  who is in the hospital for a kidney infection and son noticed she was talking slower.  Then he noticed some increased tonicity and posturing of her arms, she became very stiff, murmurs  ABD: Soft, non-tender, non-distended, +BS  Neuro: Grossly normal, CN intact, sensory intact  Psych: Affect- normal  SKIN: warm, dry  EXT: no edema    Diagnostic Data:    CBC/Chem    Recent Labs   Lab 01/01/21 2012 01/02/21  0723   RBC 4.68 4.06

## 2021-01-02 NOTE — PROGRESS NOTES
Pt covid+ 12/29. Symptoms started Unclear. Brought to the ED after having seizure at home. Currently on RA SpO2 93%. Temp 100.9 today at 0400. Inflammatory markers trending up. CCP On hold. Actemra On hold. RDV On hold. Continue heparin q8h.

## 2021-01-02 NOTE — PLAN OF CARE
Problem: Patient Centered Care  Goal: Patient preferences are identified and integrated in the patient's plan of care  Description: Interventions:  - What would you like us to know as we care for you?  \"I am Salvadorean speaking only, from home with my famil strengthening/mobility  - Encourage toileting schedule  Outcome: Progressing     Problem: Risk for Infection  Goal: Absence of fever/infection during anticipated neutropenic period  Description: INTERVENTIONS:  - Monitor WBC  - Implement neutropenic guidel d/t COVID and seizures. Admission navigator done with help of pt's son Zachary Sheriff. All admission  orders given by Dr. Meeta Vargas. Pt is alert and oriented x 2-3, can be forgetful, Tunisian speaking only, used language line. VS taken and recorded.  T: 101.3, PRN Darlene

## 2021-01-02 NOTE — ED NOTES
Orders for admission, patient is aware of plan and ready to go upstairs.  Any questions, please call ED THAIS Arndt  at extension [phone]   Type of COVID test sent:None, pt is COVID positive already  COVID Suspicion level: High    Titratable drug(s) infusing

## 2021-01-02 NOTE — ED INITIAL ASSESSMENT (HPI)
Pt to ED for witnessed seizure 40 mins PTA brought in by son. Pt is currently awake and alert, states she is in a hospital but does not know which one, unable to state the date, able to state name. Son states the seizure lasted approx.  3 mins while sitting

## 2021-01-02 NOTE — CONSULTS
32 [facility] Infectious Disease  Report of Consultation    Chloe Spears Patient Status:  Inpatient    1945 MRN [id number]   Location [facility] 5SW/SE Attending Airam Agarwal MD   [facility] Day # 1 PCP Danelle Gibson No visual changes, oral ulcers, sore throat, difficulty swallowing. Respiratory: Negative for cough, sputum, hemoptysis, chest pain, wheezing, dyspnea on exertion, or stridor.    Cardiovascular: Negative for chest pain, palpitations, irregular heart beat hemorrhage. Nose: Nares normal.   Throat:  Oropharynx clear, MMs moist.   Neck: Trachea ML, no masses. Lungs: CTA b/l no rhonchi, rales, wheezes. Chest wall: No tenderness or deformity. Heart: Regular rate and rhythm, normal S1S2, no murmurs. Currently not hypoxic and not a candidate for dexamethasone/remdesivir/CCP - we can certainly consider COVID therapeutics pending further labs and clinical course. D/w patient and it seems she has understood in spite of our language barrier. Leeanna Johnson

## 2021-01-02 NOTE — PROGRESS NOTES
[facility] Note:  Renal Dose Adjustment for Metoclopramide (REGLAN)    Mike Farrar has been prescribed Metoclopramide (REGLAN) 10 mg every 8 hours as needed for Nausea, vomiting.     Estimated Creatinine Clearance: 17.7 mL/min (A) (based on SCr of 2.47

## 2021-01-02 NOTE — ED PROVIDER NOTES
Patient Seen in: [facility] 5sw/se      History   Patient presents with:  Seizures    Stated Complaint: Seizure    HPI/Subjective:   HPI    Patient presents to the emergency department after having either a passing out episode or seizure.   Patient Extraocular Movements: Extraocular movements intact. Conjunctiva/sclera: Conjunctivae normal.      Pupils: Pupils are equal, round, and reactive to light. Neck:      Musculoskeletal: Normal range of motion and neck supple.    Cardiovascular:      Rat Abnormal; Notable for the following components:    Clarity Urine Hazy (*)     All other components within normal limits   CBC W/ DIFFERENTIAL - Abnormal; Notable for the following components:    RDW-SD 47.2 (*)     RDW 15.7 (*)     Lymphocyte Absolute 0.77 probably postinflammatory.     Dictated by (CST): Kelsi Teran MD on 1/01/2021 at 8:43 PM     Finalized by (CST): Raghav Hartman MD on 1/01/2021 at 8:45 PM          Xr Chest Ap Portable  (cpt=71045)    Result Date: 1/1/2021  CONCLUSION:

## 2021-01-03 NOTE — PROGRESS NOTES
[facility] Hospitalist Progress Note     CC: Hospital Follow up    PCP: ANITA Garcia       Assessment/Plan:     Principal Problem:    Pneumonia due to COVID-19 virus  Active Problems:    Hyponatremia    Hypokalemia    Hyperglycemia    Syncope and collapse overnight. No seizure activity    OBJECTIVE:    Blood pressure 125/74, pulse 93, temperature (!) 100.6 °F (38.1 °C), temperature source Oral, resp. rate 24, height 5' 5\" (1.651 m), weight 200 lb (90.7 kg), SpO2 90 %.     Temp:  [98 °F (36.7 °C)-100.6 °F (3 Portable  (cpt=71045)    Result Date: 1/1/2021  CONCLUSION:  1. Atypical pneumonia.     Dictated by (CST): Clotilde Hartman MD on 1/01/2021 at 8:52 PM     Finalized by (CST): Clotilde Hartman MD on 1/01/2021 at 8:52 PM              Meds:     •

## 2021-01-03 NOTE — PLAN OF CARE
Problem: Patient Centered Care  Goal: Patient preferences are identified and integrated in the patient's plan of care  Description: Interventions:  - What would you like us to know as we care for you?  \"I am Botswanan speaking only, from home with my famil strengthening/mobility  - Encourage toileting schedule  Outcome: Progressing     Problem: Risk for Infection  Goal: Absence of fever/infection during anticipated neutropenic period  Description: INTERVENTIONS:  - Monitor WBC  - Implement neutropenic guidel & md added tessalon prn, spo2 on 8l high flow sats 90, desats  in 80's, continue to wean o2, no seizure activity.  K relacement protocoo, positive coivd, remote tele v paced sr

## 2021-01-03 NOTE — PLAN OF CARE
Problem: Patient Centered Care  Goal: Patient preferences are identified and integrated in the patient's plan of care  Description: Interventions:  - What would you like us to know as we care for you?  \"I am Sao Tomean speaking only, from home with my famil strengthening/mobility  - Encourage toileting schedule  Outcome: Progressing     Problem: Risk for Infection  Goal: Absence of fever/infection during anticipated neutropenic period  Description: INTERVENTIONS:  - Monitor WBC  - Implement neutropenic guidel language line for assessment and med administration. Pt is alert and oriented x 2-3, can be forgetful. VS taken and recorded. Placed on 3L NC, sating at 90-91% at this time.  She verbalized pain on her back, unable to rate pain, pt requested for Tylenol, gi

## 2021-01-03 NOTE — PLAN OF CARE
Problem: Patient Centered Care  Goal: Patient preferences are identified and integrated in the patient's plan of care  Description: Interventions:  - What would you like us to know as we care for you?  \"I am Puerto Rican speaking only, from home with my famil strengthening/mobility  - Encourage toileting schedule  Outcome: Progressing     Problem: Risk for Infection  Goal: Absence of fever/infection during anticipated neutropenic period  Description: INTERVENTIONS:  - Monitor WBC  - Implement neutropenic guidel VSS; applied supplemental oxygen 2L, O2 levels decreasing to 86% on room air with exertion. Video monitoring in place.

## 2021-01-03 NOTE — PROGRESS NOTES
Floor rn called eeg tech on [place](Lyndsey)l re: eeg order for pt. EEG tech made aware pt has new onset seizures. No seizures on noc or day. EEG tech on call(Lyndsey) informed rn will be coming to hospital for eeg sometime this afternoon.

## 2021-01-03 NOTE — PROGRESS NOTES
Order for Covid therapeutic convalescent  FFP ordered. Floor rn called blood bank, Blood bank informed RN they do not have any right now & not to release blood only when it is available. Blood bank informed RN they have not received any yet.

## 2021-01-03 NOTE — PROGRESS NOTES
32 [facility] Infectious Disease  Progress Note    Treasure Solitario Patient Status:  Inpatient    1945 MRN [id number]   Location [facility] 5SW/SE Attending Paul Calixto MD   [facility] Day # 2 PCP Danelle Khanna     Subjective: D.dimer0.64->0.71->0.82  - A+ blood type     3. NICK on CKD  - Improving significantly with IVF support  - Crea at 1.12 today and back to her usual baseline     4. Disposition - inpatient. Supportive care ongoing.   Continue cefepime and azithromycin as R

## 2021-01-03 NOTE — PROGRESS NOTES
Floor RN called EEG tech on call to verify when coming to do EEG today. Per EEG tech(Stacy) she spoke with Neurologist(Dr. Shobha Thrasher) and he informed her  to do EEG in a.m.. EEG tech(Stacy) informed RN she will be here in the morning to do the EEG.   Pt made

## 2021-01-03 NOTE — CONSULTS
[facility]  [address]  [phone]        [address]    Report of Consultation  Damari Arroyo Patient Status:  Inpatient     1945 MRN H2 was, etc.     States that his mom was born in [facility] and grew up there. 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 I explained the pathophysiology of neurocysticercosis. Covid infection  None none  lethargy and duration of postical state 3 minute    • Date of first seizure:  ? 1/1/2021  • Presence of aura: Unknown  • Ictal manifestation: See above  • Diurnal variation: First event was at 6 PM  • Triggering factors: COVID-1 file        Active member of club or organization: Not on file        Attends meetings of clubs or organizations: Not on file        Relationship status: Not on file      Intimate partner violence        Fear of current or ex partner: Not on file        Em extremities symmetrically and spontaneously. .      Pronator drift: None   Arm Rolling: No orbiting. Finger Taps: Symmetric in rate and amplitude   Rapid movements:   Foot Taps: Foot taps are symmetric. Asterixis: No asterixis noted.    Tremor: No t Sodium 134 (L) 136 - 145 mmol/L    Potassium 3.0 (L) 3.5 - 5.1 mmol/L    Chloride 100 98 - 112 mmol/L    CO2 26.0 21.0 - 32.0 mmol/L    Anion Gap 8 0 - 18 mmol/L    BUN 27 (H) 7 - 18 mg/dL    Creatinine 2.47 (H) 0.55 - 1.02 mg/dL    BUN/CREA Ratio 10.9 10. x10(3) uL    Immature Granulocyte Absolute 0.02 0.00 - 1.00 x10(3) uL    Neutrophil % 74.8 %    Lymphocyte % 14.8 %    Monocyte % 9.8 %    Eosinophil % 0.0 %    Basophil % 0.2 %    Immature Granulocyte % 0.4 %   ABORH (BLOOD TYPE)    Collection Time: 01/01 - 145 mmol/L    Potassium 3.3 (L) 3.5 - 5.1 mmol/L    Chloride 108 98 - 112 mmol/L    CO2 23.0 21.0 - 32.0 mmol/L    Anion Gap 8 0 - 18 mmol/L    BUN 20 (H) 7 - 18 mg/dL    Creatinine 1.38 (H) 0.55 - 1.02 mg/dL    BUN/CREA Ratio 14.5 10.0 - 20.0    Calcium Granulocyte Absolute 0.01 0.00 - 1.00 x10(3) uL    Neutrophil % 70.4 %    Lymphocyte % 22.0 %    Monocyte % 7.2 %    Eosinophil % 0.0 %    Basophil % 0.0 %    Immature Granulocyte % 0.4 %           Test results/Imaging:     Ct Brain Or Head (96119)    Resu syncope. 1. Sudden onset loss of awareness, stiffening of the hands with internal rotation, and postictal confusion  Differential Diagnosis:  1.  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

## 2021-01-04 NOTE — ED PROVIDER NOTES
Called to PCU for assistance with intubation. Patient with increased work of breathing requiring intubation according to PCU RN who spoke with patient's intensivist, Dr. Jamila Joness.   Patient is noted to be satting 94% on AVAPS with tachypnea, recommend att

## 2021-01-04 NOTE — PROGRESS NOTES
Vascular Access Note    Vascular Access Screening:   Allergies to Lidocaine: no  Allergies to Latex: no  Presence of Pacemaker/Defibrillator: No   Mastectomy with Lymph Node Dissection: No  AV Fistula / AV Graft: No  Dialysis Catheter: No  Central Line: No

## 2021-01-04 NOTE — PROGRESS NOTES
[facility] Hospitalist Progress Note     CC: Hospital Follow up    PCP: ANITA Orta       Assessment/Plan:     Principal Problem:    Pneumonia due to COVID-19 virus  Active Problems:    Hyponatremia    Hypokalemia    Hyperglycemia    Syncope and collapse course.   [facility] hospitalist to continue to follow patient while in house     Patient and/or patient's family given opportunity to ask questions and note understanding and agreeing with therapeutic plan as outlined  Glenys Wagner MD  [facility] Hospitalist  An 56* 72   GFRNAA 37* 48* 63   CA 8.3* 8.8 7.9*    140 138   K 3.3*  3.3* 3.8 4.0    107 109   CO2 23.0 24.0 24.0       Recent Labs   Lab 01/01/21 2012 01/02/21  0723 01/03/21  0538 01/04/21  0545   ALT 38 30 33 29   AST 37 34 42* 48*   ALB 3.2*

## 2021-01-04 NOTE — PLAN OF CARE
Covid +:   Symptom Onset: unclear  Tmax: 102.5 on 21  O2: BIPAP/AVAPS,  EPAP 8 and fio2 100%,O2 sat 92%    Monitor Labs,    LDH: 287 -> 426 -> 570  Ferritin: 184 -> 354 -> 454  CRP: 9.62 -> 13.8 -> 14.50  DDimer: 0.71 -> 0.82 -> 0.87

## 2021-01-04 NOTE — PROGRESS NOTES
Dr. Marti Toribio called re: pt's pulse ox levels fluctuate 85, 88, 90, 91 on high flow o2 at 8 liters, given robitussin  For cough as ordered & pt c/o lungs feeling tight, denies chest pain.    md informed rn she will order inhaler for her, & ok with md if  o2

## 2021-01-04 NOTE — SIGNIFICANT EVENT
ICU nocturnist.    RRT called to room 46.    66-year-old Syriac female presented with Covid pneumonitis and acute respiratory failure somewhat short of breath at this time. As per RN patient desatted into the 70s on high flow nasal cannula.     On exam

## 2021-01-04 NOTE — CONSULTS
Critical Care Consult     Assessment / Plan:  1. Acute respiratory failure - due to COVID-19 PNA  - AVAPS has failed her. Will proceed with intubation  2.  COVID-19 PNA  - remdesivir (1/3- )  - dexamethasone 10 mg bid (1/4- )  - patient declined CCP  - PCT mouth., Disp: , Rfl: , 12/31/2020 at 0700    •  simvastatin 20 MG Oral Tab, Take 20 mg by mouth daily. , Disp: , Rfl: , 12/31/2020 at 0900        •  Rosuvastatin Calcium 40 MG Oral Tab, Take 40 mg by mouth nightly., Disp: , Rfl:     •  baclofen 10 MG Oral T questions appropriately, appropriate affect    Labs:  Reviewed in EMR    Inpatient Medications:  Reviewed in EMR    Imaging:   Chest imaging reviewed

## 2021-01-04 NOTE — PAYOR COMM NOTE
Appropriate for inpatient status per guidelines for dizziness and weakness due to COVID pneumonia with hypoxia.      --------------  ADMISSION REVIEW     Payor: [employer] ADV PPO  Subscriber #:  [id number]  Authorization Number: [id number]       ED Pro normal.      Nose: Nose normal.      Mouth/Throat:      Mouth: Mucous membranes are moist.   Eyes:      Extraocular Movements: Extraocular movements intact.       Conjunctiva/sclera: Conjunctivae normal.      Pupils: Pupils are equal, round, and reactive to Procalcitonin 0.34 (*)     All other components within normal limits   URINALYSIS WITH CULTURE REFLEX - Abnormal; Notable for the following components:    Clarity Urine Hazy (*)     All other components within normal limits   CBC W/ DIFFERENTIAL - Abnormal 1/1/2021  CONCLUSION:  1. No acute findings. 2. Small calcification in the left parietal lobe, probably postinflammatory.     Dictated by (CST): Umu Verdugo MD on 1/01/2021 at 8:43 PM     Finalized by (CST): Leobardo Hartman MD on 1/01/20 markers    HTN  - BP low  - will hold home amlodipine, diltiazem and losartan HCTZ    HL  - statin    Hypothyroidism  - continue home synthroid    TIARRA  - CPAP    FN:  - IVF: none  - Diet: regular    DVT Prophy: SCD, lovenox  Lines: PIV    Dispo: pending cl 3. 3*  3.3*    108   CO2 26.0 23.0     Lab Results   Component Value Date    WBC 2.6 01/02/2021    HGB 11.0 01/02/2021    HCT 32.6 01/02/2021    .0 01/02/2021    CREATSERUM 1.38 01/02/2021    BUN 20 01/02/2021     01/02/2021    K 3.3 01/0 seizure with loss of awareness in the setting of a COVID-19 infection. The patient's son reports that the patient suddenly had a blank look on her face, kept her eyes open and gaze directed forward, and stiffened her hands, which then internally rotated. discussed experimental therapies dexamethasone, CCP, remdesivir with patient, agreeable to all  - discussed therapeutic proning  - ID consulted   - start decadron   - no remdesivir or CCP at this time  - trend inflammatory markers     HTN  - BP low  - will 28  /60  Pulse ox 76% on 15 L high flow nasal cannula  Alert oriented in mild respiratory distress  Chest bilateral rhonchi  Heart tachycardic  Abdomen soft  Extremities no edema.     Assessment and plan     Acute respiratory failure with hypoxia  Se Given 100 mg Oral Luis Geronimo, RN      Cefepime HCl (MAXIPIME) 1 g in sodium chloride 0.9% 100 mL MBP/add-vantage     Date Action Dose Route User    1/3/2021 2215 New Bag 1 g Intravenous Luis Geronimo RN    1/3/2021 1105 New Bag 1 g Maryclare Query THAIS Silverman      potassium chloride (KLOR-CON) powder packet 40 mEq     Date Action Dose Route User    1/3/2021 1118 Given by Other 40 mEq Oral Carmichelee Niels RN      remdesivir 200 mg in sodium chloride 0.9% 250 mL IVPB     Date Action Dose Route User

## 2021-01-04 NOTE — PLAN OF CARE
Problem: Patient Centered Care  Goal: Patient preferences are identified and integrated in the patient's plan of care  Description: Interventions:  - What would you like us to know as we care for you?  \"I am Nicaraguan speaking only, from home with my famil strengthening/mobility  - Encourage toileting schedule  Outcome: Progressing     Problem: Risk for Infection  Goal: Absence of fever/infection during anticipated neutropenic period  Description: INTERVENTIONS:  - Monitor WBC  - Implement neutropenic guidel language line for assessment and med administration. Pt is alert and oriented x 2-3, can be forgetful. VS taken and recorded. Pt requested for Tylenol, cough medication, and lozenges, given. Maintain on fall and seizure precautions at this time.  Still on r

## 2021-01-04 NOTE — PLAN OF CARE
Updates provided to swetha, Dr. Kin Young regarding pt's status. Pt is currently satting around 88 to 92% on Vaportherm 30L and 15L of Nonrebreather mask, on prone position.  Per Nocturnalist, she could benefit as CCU transfer, but no bed availability at

## 2021-01-04 NOTE — PLAN OF CARE
RRT: low O2 sats    Received pt sitting on recliner on 8L highflow nasal cannula. Pt requested to be transferred to bed, she was assisted by this nurse with a walker. Pt appeared SOB, VS taken, O2 sats were fluctuating from low 80s to high 70s.  Increased t

## 2021-01-04 NOTE — PLAN OF CARE
Problem: Patient Centered Care  Goal: Patient preferences are identified and integrated in the patient's plan of care  Description: Interventions:  - What would you like us to know as we care for you?  \"I am Citizen of [place] speaking only, from home with my famil strengthening/mobility  - Encourage toileting schedule  Outcome: Progressing     Problem: Risk for Infection  Goal: Absence of fever/infection during anticipated neutropenic period  Description: INTERVENTIONS:  - Monitor WBC  - Implement neutropenic guidel 100% at 97%, Patient prone position, awaiting for CCU transfer, sob on movement, will continue to monitor, call light within reach.

## 2021-01-04 NOTE — PROGRESS NOTES
32 [facility] Infectious Disease  Progress Note    Aakash Hopper Patient Status:  Inpatient    1945 MRN [id number]   Location [facility] 5SW/SE Attending Laura Zurita MD   [facility] Day # 3 PCP Danelle Khanna     Subjective: fevers  - O2 needs rising, now from 5L->8L->BiPap  - COVID antibodies negative and now appears to be earlier in her course than initially thought  - Dexamethasone day #2  - Remdesivir day #2  - CCP was ordered 1/3 but product not yet available and family m

## 2021-01-05 NOTE — PLAN OF CARE
Transfer from 5th floor due to increasing tachypnea, requiring AVAPS for last several hours, plan for intubation. Intubated by ER doc at approximately 1450. This RN spoke with son and updated. PICC line placed, propofol, IV remdesivir, decadron, cefepime. Instruct and reinforce with patient and family use of appropriate assistive device and precautions (e.g. spinal or hip dislocation precautions)  Outcome: Progressing     Problem: Safety Risk - Non-Violent Restraints  Goal: Patient will remain free from sonja

## 2021-01-05 NOTE — DIETARY NOTE
ADULT NUTRITION INITIAL ASSESSMENT    Pt is at high nutrition risk. Pt does not meet malnutrition criteria. RECOMMENDATIONS TO MD:  Tube feeding at trickle rate to start (recommend within 12 hours of intubation if pt is hemodynamically stable).  Will Vitamin and mineral supplements: none  - Feeding assistance: NPO  - Nutrition education: not appropriate  - Coordination of nutrition care: collaboration with other providers and discussed in Care Rounds   - Discharge and transfer of nutrition care to [facility] azithromycin  500 mg Oral Q24H [facility]   fentaNYL citrate **OR** fentaNYL citrate, acetaminophen **OR** acetaminophen **OR** acetaminophen, fentanyl, guaiFENesin, benzocaine-menthol, benzonatate, baclofen, LORazepam, ondansetron HCl, Metoclopramide HCl      LA

## 2021-01-05 NOTE — PROGRESS NOTES
854pm: Call from Radiologist that patient lower extremeties doppler positive for DVT in left leg. Dr Jose Kohler. [facility] hospitalist made aware and patient to receive full dose lovenox. No noted signs of active bleeding.  Patient already received scheduled PM

## 2021-01-05 NOTE — PROGRESS NOTES
Arterial Line Insertion Procedure Note     Indication: Hemodynamic monitoring, frequent arterial blood gas monitoring     Procedure: The patient's right wrist was prepped in usual sterile fashion.  A 20 gauge Arrow arterial line was introduced into the righ

## 2021-01-05 NOTE — PROGRESS NOTES
Critical Care Progress Note     Assessment / Plan:  1. Acute respiratory failure - due to COVID-19 PNA. Intubated 1/4  - continue full vent support; wean FiO2 and PEEP as able  - wean sedation as able  - proning protocol  2.  COVID-19 PNA  - remdesivir (1/3

## 2021-01-05 NOTE — PROGRESS NOTES
ASSESSMENT/PLAN:     Impression: 1. Abnormal left ventricular function on echo. EKG shows no acute ischemic findings. Her pattern of LV dysfunction is consistent with Takotsubo. 2.  Covid   3. Acute hypoxic respiratory failure  4.   History of hyper melena, hematochezia. :denies hematuria. INTEG:no new rashes. MS:no limiting arthritis. NEURO:no localized deficits. HEME/LYMPH:denies easy bruising. ALL:no new allergies. ROS otherwise negative except as in HPI.   EXAM:   BP (!) 89/61 (BP Location: Left

## 2021-01-05 NOTE — PROGRESS NOTES
[facility] Hospitalist Progress Note     CC: Hospital Follow up    PCP: ANITA Portillo       Assessment/Plan:     Principal Problem:    Pneumonia due to COVID-19 virus  Active Problems:    Hyponatremia    Hypokalemia    Hyperglycemia    Syncope and collapse EEG at this time given worsening respiratory status     HTN  - BP low  - will hold home amlodipine, diltiazem 240 and losartan HCTZ     HL  - statin     Hypothyroidism  - continue home synthroid     TIARRA  - CPAP    GOC  - CODE- DNR/full treatment- long disc posterior  CV: RRR, no murmurs  ABD: Soft, non-tender, non-distended, +BS  Neuro: unable to assess  Psych: unable to assess  SKIN: warm, dry  EXT: no edema    Data Review:       Labs:     Recent Labs   Lab 01/01/21 2012 01/02/21  3888 01/03/21  0549 01/04 A preliminary report was issued by the [address] [place] [address] Radiology teleradiology service. There are no major discrepancies.      Dictated by (CST): Irena Espinosa MD on 1/05/2021 at 7:43 AM     Finalized by (CST): Irena Espinosa MD on 1/05/2021 at 7:47 AM          Xr Ch 1/5/2021  CONCLUSION:  1. No large or central pulmonary embolus. Evaluation for small peripheral emboli precluded by respiratory motion artifact. 2. Severe multifocal bilateral COVID-19 pneumonia/lung injury.  3. ET tube tip in proximal left mainstem bronc

## 2021-01-05 NOTE — PLAN OF CARE
Patient proned at midnight following CT scan to chest and showed improvement in vitals, see documentation. Repeat ABG showing improvement. Son updated per request overnight. On call hospitalist and on call pulmonologist made aware of events overnight.  Lakeisha Norwood of affected body part  Description: INTERVENTIONS:  - Support and protect limb and body alignment per provider's orders  - Instruct and reinforce with patient and family use of appropriate assistive device and precautions (e.g. spinal or hip dislocation pr

## 2021-01-05 NOTE — PROGRESS NOTES
[facility] Infectious Disease  Progress Note    Arnoldo Landau Patient Status:  Inpatient    1945 MRN [id number]   Location [facility] 2W/SW Attending Starr Oliver MD   [address] Day # 4 PCP Danelle Khanna     Subjective: course than initially thought  - Dexamethasone day #3, dose increased  - Remdesivir day #3  - CCP declined by patient     2.  Abnormal labs due to COVID+ status  - Ferritin 147->184->354->454->578  - ->287->426->570->876  - CRP 9.51->9.62->13.8->14.

## 2021-01-05 NOTE — PLAN OF CARE
Received on restraints, no sign of injury to bilateral wrists, will continue restraints for patient safety.      Problem: Safety Risk - Non-Violent Restraints  Goal: Patient will remain free from self-harm  Description: INTERVENTIONS:  - Apply the least res

## 2021-01-06 NOTE — PROGRESS NOTES
32 [facility] Infectious Disease  Progress Note    Maynor Boykin Patient Status:  Inpatient    1945 MRN [id number]   Location [facility] 2W/SW Attending Miguel A Jones MD   [facility] Day # 5 PCP Danelle Khanna     Subjective: thought  - Dexamethasone day #4, dose increased  - Remdesivir day #4  - Cefepime/azithromycin day #5  - CCP declined by patient     2.  Abnormal labs due to COVID+ status  - Ferritin 147->184->354->454->578  - ->287->426->570->876  - CRP 9.51->9.62-

## 2021-01-06 NOTE — PROGRESS NOTES
[facility] Hospitalist Progress Note     CC: Hospital Follow up    PCP: ANITA Keller       Assessment/Plan:     Principal Problem:    Pneumonia due to COVID-19 virus  Active Problems:    Hyponatremia    Hypokalemia    Hyperglycemia    Syncope and collapse repeat EKG  - cardiology consulted appreciate recs    Hyperglycemia  - likely 2/2 steroids, check HgA1c  - will start insulin gtt     Seizure  - seizure like activity at home witness by her son, lasted for about 2 mins, post ictal confusion  - CT brain wit %      Intake/Output:    Intake/Output Summary (Last 24 hours) at 1/6/2021 1614  Last data filed at 1/6/2021 1500  Gross per 24 hour   Intake 3657.2 ml   Output 685 ml   Net 2972.2 ml       Last 3 Weights  01/06/21 0500 : 213 lb 6.5 oz (96.8 kg)  01/05/21 veins.  2. No DVT in the right lower extremity. Results of this examination were discussed with the patient's nurse, Marybel Sánchez RN, by Dr. Bhavya Marvin at 20:54 on 01/04/2021.   Dictated by (CST): Rhoda Stokes MD on 1/04/2021 at 8:47 PM     Finalized by ( at 9:30 PM          Xr Chest Ap Portable  (cpt=71045)    Result Date: 1/4/2021  CONCLUSION:  1. Placement of right PICC line into the SVC. 2. Endotracheal tube projects just proximal to the zarina. 3. Persistent diffuse bilateral pulmonary opacification. Intravenous Daily   • lidocaine (PF)  5 mL Intradermal Once   • artificial tears   Both Eyes TID   • remdesivir IVPB  100 mg Intravenous Q24H   • latanoprost  1 drop Both Eyes Nightly   • Levothyroxine Sodium  112 mcg Oral Before breakfast   • atorvastatin

## 2021-01-06 NOTE — PLAN OF CARE
Problem: Patient Centered Care  Goal: Patient preferences are identified and integrated in the patient's plan of care  Description: Interventions:  - What would you like us to know as we care for you?  \"I am Haitian speaking only, from home with my famil Restraints  Goal: Patient will remain free from self-harm  Description: INTERVENTIONS:  - Apply the least restrictive restraint to prevent harm  - Notify patient and family of reasons restraints applied  - Assess for any contributing factors to confusion (

## 2021-01-06 NOTE — CONSULTS
NEPHROLOGY CONSULT NOTE     DATE: 1/6/2021    Requesting Physician: Dr. Mayra Gonzalez     Reason for Consult: NICK      HISTORY OF PRESENT ILLNESS: Celine Osullivan is a 76year old female with history of HTN and HLD.   Initially presented to the ED with possible connections        Talks on phone: Not on file        Gets together: Not on file        Attends [employer] service: Not on file        Active member of club or organization: Not on file        Attends meetings of clubs or organizations: Not on file        R Intravenous, Q30 Min PRN    Or    •  fentaNYL citrate (SUBLIMAZE) 0.05 MG/ML injection 50 mcg, 50 mcg, Intravenous, Q30 Min PRN    •  acetaminophen (TYLENOL) tab 650 mg, 650 mg, Oral, Q6H PRN    Or    •  acetaminophen (TYLENOL) 160 MG/5ML oral liquid 650 m MG Oral Tab, Take 40 mg by mouth nightly. •  baclofen 10 MG Oral Tab, Take 10 mg by mouth 3 (three) times daily. •  Losartan Potassium-HCTZ 100-25 MG Oral Tab, Take 1 tablet by mouth daily.     •  amLODIPine Besylate 10 MG Oral Tab, Take 10 mg by mout 1. Completely occlusive DVT in the deep branch of the duplicated mid left femoral vein with extension of partially occlusive DVT into the distal left femoral and popliteal veins. 2. No DVT in the right lower extremity.    Results of this examination were d Darya Chapin, by Dr. Chidi Bhatti at 21:30 on 01/04/2021.   Dictated by (CST): Alexandra Goodell, MD on 1/04/2021 at 9:27 PM     Finalized by (CST): Alexandra Goodell, MD on 1/04/2021 at 9:30 PM          Xr Chest Ap Portable  (cpt=71045)    Result Date: 1/4/2021  CO and hospital course has been complicated by acute respiratory failure requiring intubation, takotsubo cardiomyopathy, septic / cardiogenic shock requiring vasopressor support. Nephrology is consulted for NICK.       Non-oliguric NICK   - NICK initially improv

## 2021-01-06 NOTE — PLAN OF CARE
Soft restraints on, skin intact and no sign of injury, see documentation, will continue at this time for patient safety.      Problem: Safety Risk - Non-Violent Restraints  Goal: Patient will remain free from self-harm  Description: INTERVENTIONS:  - Apply

## 2021-01-06 NOTE — PROGRESS NOTES
ASSESSMENT/PLAN:     Impression: 1. Abnormal left ventricular function on echo. EKG shows no acute ischemic findings. Her pattern of LV dysfunction is consistent with Takotsubo. 2.  Covid   3. Acute hypoxic respiratory failure; worse  4.  Hypotension discussed. HEAD/FACE:no trauma, normocephalic. EYES:conjunctiva not injected, no xanthelasma. ENT:mucosa pink and moist. NECK:jugular venous pressure not elevated. RESP:normal rate and rhythm, crackles GI:soft,obeseon-tender;rectal deferred.  MS:inadequate

## 2021-01-06 NOTE — PLAN OF CARE
Patient proned, sedated. Poor tolerance to reposition, oral and deep suctioning: patient responds with increased HR and RR, reaching for ETT.  Patient stiff in joints with repositioning, hot pack placed behind neck and passive range of motion performed resu INTERVENTIONS:  - Support and protect limb and body alignment per provider's orders  - Instruct and reinforce with patient and family use of appropriate assistive device and precautions (e.g. spinal or hip dislocation precautions)  Outcome: Progressing to side and suction secretions as needed  - Reorient patient post seizure  - Seizure pads on all 4 side rails  - Instruct patient/family to notify RN of any seizure activity  - Instruct patient/family to call for assistance with activity based on assessmen

## 2021-01-06 NOTE — PROGRESS NOTES
Critical Care Progress Note     Assessment / Plan:  1. Acute respiratory failure - due to COVID-19 PNA. Intubated 1/4  - continue full vent support; wean FiO2 and PEEP as able  - wean sedation as able  - proning protocol  2.  COVID-19 PNA  - remdesivir (1/3 sedated    Medications:  Reviewed in EMR    Lab Data:  Reviewed in EMR    Imaging:  I independently visualized all relevant chest imaging in PACS and agree with radiology interpretation except where noted.

## 2021-01-06 NOTE — CM/SW NOTE
1/6 1025  CM received MDO for to complete POLST form. CM placed POLST form on chart. MD to address and notify CM once completed with the pt/ family and signed by MD or APN. Addendum, 1/7/2021  CM received 2nd MDO for POLST  Form.   POLST form on ch

## 2021-01-07 NOTE — PROGRESS NOTES
ASSESSMENT/PLAN:     Impression: 1. Abnormal left ventricular function on echo. EKG shows no acute ischemic findings. Her pattern of LV dysfunction is consistent with Takotsubo. Doing very poorly on near max suupport  2. Covid   3.   Acute hypoxic re Readings from Last 3 Encounters:  01/07/21 : 218 lb 7.6 oz (99.1 kg)    CONS:pt prone. WEIGHT:not discussed. HEAD/FACE:no trauma, normocephalic. EYES:conjunctiva not injected, no xanthelasma.  ENT:mucosa pink and moist. NECK:jugular venous pressure not elev

## 2021-01-07 NOTE — PROGRESS NOTES
Spoke with son. Updated on poor prognosis. He states, \"it is time\" and wishes to pursue comfort care only. He states he has spoken to his family and they are in agreement. Discussed with RN. Will stop vasopressors and perform compassionate wean.  Critical

## 2021-01-07 NOTE — PLAN OF CARE
Problem: Patient Centered Care  Goal: Patient preferences are identified and integrated in the patient's plan of care  Description: Interventions:  - What would you like us to know as we care for you?  \"I am Nigerien speaking only, from home with my famil Implement neutropenic guidelines  Outcome: Progressing     Problem: RESPIRATORY - ADULT  Goal: Achieves optimal ventilation and oxygenation  Description: INTERVENTIONS:  - Assess for changes in respiratory status  - Assess for changes in mentation and beha self-harm  Description: INTERVENTIONS:  - Apply the least restrictive restraint to prevent harm  - Notify patient and family of reasons restraints applied  - Assess for any contributing factors to confusion (electrolyte disturbances, delirium, medications) threatening arrhythmias  - Monitor electrolytes and administer replacement therapy as ordered  Outcome: Progressing  Note: Patient has converted to NSR from rate controlled atrial fibrillation.      Problem: Patient/Family Goals  Goal: Patient/Family Long T

## 2021-01-07 NOTE — RESPIRATORY THERAPY NOTE
ART line placed with 20 G arrow cathter, without any complication. Done with good pulsation and pullback.
O2 sat  80% on vapotherm 100% and NR MASK 15 lpm,RR 40,placed on BIPAP/AVAPS - RR 14, ml, pressure range 10-25, EPAP 8 and fio2 100%,O2 sat 92%,RR 18-25. DR Hunter Ibanez notified, patient did not tolerate BIPAP settings. ABG ordered.   0925am : ABG done ,P
Patient received proned , intubated and mechanically ventilated with a size 7.5 ETT secured 23@ the lip on the following vent settings: PRVC 24, 400, +16, 90%. Per Dr. Steven Bowman request from previous shift, reduced PEEP to 14.  Patient's head rotated from r
Pt does not use CPAP machine at home and will not be using CPAP during hospital stay.
Pt on current vent settings PRVC/14/400/+14/100% ; ETT 7.5 @ 23 cm. ETT moved from 25 to 23 per CXR. Pt proned around [phone] with ABG after. Suctioned pt as needed throughout the night. Pt tolerating and saturating appropriately.   No acute changes overni
Received patient intubated/mechanically ventilated on full support in prone position. FIO2 weaned to 75%. Patient maintaining appropriately sats. Suction provided as needed. BS heard bilaterally, no other changes made at this time.
Attending Only

## 2021-01-07 NOTE — CM/SW NOTE
MSW to reach out to family to arrange meet time.   MILLIE Box  [facility]  [phone]

## 2021-01-07 NOTE — PROGRESS NOTES
Critical Care Progress Note     Assessment / Plan:  1. Acute respiratory failure - due to COVID-19 PNA and heart failure.  Intubated 1/4  - continue full vent support  - ABG reviewed; increase RR and tidal volume  - wean sedation as able  - proning protocol in EMR    Lab Data:  Reviewed in EMR    Imaging:  I independently visualized all relevant chest imaging in PACS and agree with radiology interpretation except where noted.

## 2021-01-07 NOTE — PROGRESS NOTES
32 [facility] Infectious Disease  Progress Note    Ardyce Click Patient Status:  Inpatient    1945 MRN [id number]   Location [facility] 2W/SW Attending Ibis Ellison MD   [address] Day # 6 PCP Danelle Khanna     Subjective: initially thought  - Dexamethasone day #5, dose increased  - Remdesivir day #5  - Cefepime/azithromycin day #6  - CCP declined by patient     2.  Abnormal labs due to COVID+ status  - Ferritin 147->184->354->454->578  - ->287->426->570->876  - CRP 9

## 2021-01-07 NOTE — PROGRESS NOTES
NEPHROLOGY DAILY PROGRESS NOTE       Follow up Reason: NICK      SUBJECTIVE:  Worsening hypotension, now on 3 vasopressors   UOP declining     OBJECTIVE:    Total Intake/Output:    Intake/Output Summary (Last 24 hours) at 1/7/2021 1029  Last data filed at 1 infusion, 1-28 Units/hr, Intravenous, Continuous    •  glucose (DEX4) oral liquid 15 g, 15 g, Oral, Q15 Min PRN    Or    •  Glucose-Vitamin C (DEX-4) chewable tab 4 tablet, 4 tablet, Oral, Q15 Min PRN    Or    •  dextrose 50 % injection 50 mL, 50 mL, Intra MG/5ML solution 200 mg, 200 mg, Oral, Q4H PRN    •  benzocaine-menthol (CEPACOL (SUGAR-FREE)) 1 lozenge, 1 lozenge, Oral, Q3H PRN    •  benzonatate (TESSALON) cap 100 mg, 100 mg, Oral, TID PRN    •  latanoprost (XALATAN) 0.005 % ophthalmic solution 1 drop, lung damage. There has been partial improvement, probably relating to resolving superimposed edema in the setting of CHF/fluid overload.      Dictated by (CST): Rhoda Stokes MD on 1/05/2021 at 6:39 PM     Finalized by (CST): Rhoda Stokes MD on

## 2021-01-07 NOTE — PROGRESS NOTES
ROM Hospitalist Progress Note     CC: Hospital Follow up    PCP: ANITA Portillo       Assessment/Plan:     Principal Problem:    Pneumonia due to COVID-19 virus  Active Problems:    Hyponatremia    Hypokalemia    Hyperglycemia    Syncope and collapse to renal function  - son states she had a prior PE after knee surgery in 2005 and was on blood thinners for 6 months, no prior VTE    Elevated troponin  - TTE with decreased EF  - EKG low voltage, no ST changes, repeat EKG  - cardiology consulted appreciat Leno this AM, long discussion given patient's worsening clinical status. Agreeable to DNR/comfort. He would like father and brother to be able to see patient. OBJECTIVE:    Blood pressure (!) 67/37, pulse 60, temperature 97.5 °F (36.4 °C), resp.  rate 4.2 4.4 3.8    104 103   CO2 23.0 22.0 20.0*       Recent Labs   Lab 01/01/21 2012 01/02/21  0723 01/03/21  0538 01/04/21  0545 01/05/21  0406 01/06/21  0413 01/07/21  0430   ALT 38 30 33 29 30 42 88*   AST 37 34 42* 48* 66* 90* 199*   ALB 3.2* 2.7* Hypoexpanded lungs. Progressed bilateral pulmonary opacities with near total opacification of both lungs. Endotracheal tube has been adjusted, tip now projects 3.4 centimeters above the zarina.   A preliminary report was issued by the [address] [place] [address] Radiology tel precluded by respiratory motion artifact. 2. Severe multifocal bilateral COVID-19 pneumonia/lung injury. 3. ET tube tip in proximal left mainstem bronchus. 4. Multivessel coronary artery calcification.  5. No major discrepancy with preliminary [facility] radiol HCl

## 2021-01-08 NOTE — DISCHARGE SUMMARY
General Medicine Discharge Summary     Patient ID:  Mike Farrar  76year old  12/12/1945    Admit date: 1/1/2021    Discharge date and time: 01/07/21    Attending Physician: Elisabeth Garcia MD     Primary Care Physician: ANITA Jacobs levophed, vasopressin, dobutamine     COVID  - symptoms started 12/28, +COVID 12/29  - initially on RA on admit, on 5L but 1/3 overnight with significant worsening, on BIPAP 1/4 and then intubated 1/4 afternoon  - PCT mildly elevated, abx cefepime azithrom updated son Sirisha Hobbs on 1/7  - worsening clinical status, discussed DNR/comfort and hospice, patient agreeable  - son would like to facetime mother today, would like his father and brother to be able to see his mother        Operative Procedures:      Imagin multifocal bilateral COVID-19 pneumonia/lung injury. 3. ET tube tip in proximal left mainstem bronchus. 4. Multivessel coronary artery calcification. 5. No major discrepancy with preliminary [facility] radiology report.     Dictated by (CST): Maureen Harmon MD

## 2021-01-08 NOTE — PLAN OF CARE
Problem: Patient Centered Care  Goal: Patient preferences are identified and integrated in the patient's plan of care  Description: Interventions:  - What would you like us to know as we care for you?  \"I am Eritrean speaking only, from home with my famil strengthening/mobility  - Encourage toileting schedule  Outcome: Progressing     Problem: Risk for Infection  Goal: Absence of fever/infection during anticipated neutropenic period  Description: INTERVENTIONS:  - Monitor WBC  - Implement neutropenic guidel Progressing     Problem: CARDIOVASCULAR - ADULT  Goal: Maintains optimal cardiac output and hemodynamic stability  Description: INTERVENTIONS:  - Monitor vital signs, rhythm, and trends  - Monitor for bleeding, hypotension and signs of decreased cardiac ou pressure  - Maintain blood pressure and fluid volume within ordered parameters to optimize cerebral perfusion and minimize risk of hemorrhage  - Monitor temperature, glucose, and sodium.  Initiate appropriate interventions as ordered  Outcome: Progressing delirium  Description: Interventions:  - Encourage use of hearing aids, eye glasses  - Promote highest level of mobility daily  - Provide frequent reorientation  - Promote wakefulness i.e. lights on, blinds open  - Promote sleep, encourage patient's normal

## 2021-01-08 NOTE — PAYOR COMM NOTE
--------------  DISCHARGE REVIEW    Payor: [employer] AD[employer] PPO  Subscriber #:  [id number]  Authorization Number: [id number]    Admit date: 1/1/21  Admit time:  2218  Discharge Date: 1/7/2021  7:04 PM     Admitting Physician: Carmen Torres MD  Attend vapotherm 1/4, intubated 1/4. Found to have DVT, started on therapeutic lovenox, also with elevated troponin and decreased EF. Cardiology consulted, started on IV lasix.  Now with progressively worsening cardiogenic/septic shock on 3 pressors and hypotensiv gtt     Seizure  - seizure like activity at home witness by her son, lasted for about 2 mins, post ictal confusion  - CT brain without acute findings  - neurology consulted, appreciate recs, plan for EEG, no need for MRI at this time or seizure meds  - hol 1/5/2021  CONCLUSION:   Hypoexpanded lungs. Progressed bilateral pulmonary opacities with near total opacification of both lungs. Endotracheal tube has been adjusted, tip now projects 3.4 centimeters above the zarina.   A preliminary report was issued by